# Patient Record
Sex: MALE | Race: WHITE | URBAN - METROPOLITAN AREA
[De-identification: names, ages, dates, MRNs, and addresses within clinical notes are randomized per-mention and may not be internally consistent; named-entity substitution may affect disease eponyms.]

---

## 2017-01-02 DIAGNOSIS — M25.552 LEFT HIP PAIN: Primary | ICD-10-CM

## 2017-01-02 RX ORDER — TRAMADOL HYDROCHLORIDE 50 MG/1
50 TABLET ORAL EVERY 6 HOURS PRN
Qty: 60 TABLET | Refills: 2 | Status: SHIPPED | OUTPATIENT
Start: 2017-01-02

## 2017-01-02 NOTE — TELEPHONE ENCOUNTER
traMADol (ULTRAM) 50 MG tablet      Last Written Prescription Date:  10-27-14  Last Fill Quantity: 60,   # refills: 3  [facility] provider: 10-6-16  Future Office visit:    Next 5 appointments (look out 90 days)     Jan 18, 2017  9:15 AM   Nurse [facility] with [facility]LL[facility]   [facility]    [address] [place] [id number] [phone]                   Routing refill request to provider for review/approval because:  Drug not on the [facility], [facility] or [facility] refill protocol or controlled substance

## 2017-01-03 ENCOUNTER — OFFICE VISIT (OUTPATIENT)
Dept: FAMILY MEDICINE | Facility: CLINIC | Age: 82
End: 2017-01-03
Payer: COMMERCIAL

## 2017-01-03 VITALS
WEIGHT: 158 LBS | TEMPERATURE: 97 F | BODY MASS INDEX: 25.39 KG/M2 | DIASTOLIC BLOOD PRESSURE: 71 MMHG | SYSTOLIC BLOOD PRESSURE: 142 MMHG | HEART RATE: 77 BPM | HEIGHT: 66 IN | OXYGEN SATURATION: 100 %

## 2017-01-03 DIAGNOSIS — S50.312A ABRASION OF LEFT ELBOW, INITIAL ENCOUNTER: Primary | ICD-10-CM

## 2017-01-03 PROCEDURE — 99213 OFFICE O/P EST LOW 20 MIN: CPT | Performed by: FAMILY MEDICINE

## 2017-01-03 NOTE — PROGRESS NOTES
SUBJECTIVE:                                                    Paul Nava is a 83 year old male who presents to clinic today for the following health issues:      Concern - Cut on left arm     Onset: Fell yesterday in the kitchen and has a cut on his left arm     Description:   Scraped the skin on his arm    Intensity: moderate    Progression of Symptoms:  same    Accompanying Signs & Symptoms:  Fell while sitting down on a chair and hit his arm on the refrigerator       Previous history of similar problem:       Precipitating factors:   Worsened by:     Alleviating factors:  Improved by:        Therapies Tried and outcome: None      No loss of consciousness. No other apparent injuries. He mainly just has the abrasion over the left elbow that he is concerned about. The rest of the left arm doesn't hurt.    Problem list and histories reviewed & adjusted, as indicated.  Additional history: as documented    Patient Active Problem List   Diagnosis     ED (erectile dysfunction)     Headaches, tension     PAD (peripheral artery disease) (H)     Microalbuminuria     Hyperlipidemia LDL goal <100     Parkinson disease (H)     Type 2 diabetes, HbA1C goal < 8% (H)     CKD (chronic kidney disease) stage 3, GFR 30-59 ml/min     Advance Care Planning     Health Care Home     Subclinical hypothyroidism     Anemia in chronic renal disease     Type 2 diabetes mellitus with diabetic retinopathy, macular edema presence unspecified, with unspecified retinopathy severity     Essential hypertension with goal blood pressure less than 140/90     Type 2 diabetes mellitus with diabetic nephropathy, without long-term current use of insulin (H)     Past Surgical History   Procedure Laterality Date     Hc knee scope,single menisectomy  1968     right     Hernia repair, inguinal rt/lt  1993     bilateral     Appendectomy  5/98     C nonspecific procedure  8/98     bx of left anxillary lymph nodes     Cataract iol, rt/lt  10/11     bilat  "cataracts removed in Oct 2011       Social History   Substance Use Topics     Smoking status: Never Smoker      Smokeless tobacco: Never Used     Alcohol Use: No     Family History   Problem Relation Age of Onset     Hypertension Brother      DIABETES Brother      Hypertension Sister      HEART DISEASE Brother      CEREBROVASCULAR DISEASE Father      HEART DISEASE Father      DIABETES Mother          Current Outpatient Prescriptions   Medication Sig Dispense Refill     traMADol (ULTRAM) 50 MG tablet Take 1 tablet (50 mg) by mouth every 6 hours as needed for pain 60 tablet 2     pravastatin (PRAVACHOL) 40 MG tablet Take 1 tablet (40 mg) by mouth daily 90 tablet 3     blood glucose (DAVEY BREEZE 2) test strip Test blood sugar 1-2 times a day. 100 each 4     lisinopril (PRINIVIL,ZESTRIL) 2.5 MG tablet Take 1 tablet (2.5 mg) by mouth daily 90 tablet 3     hydrochlorothiazide (HYDRODIURIL) 25 MG tablet Take 1 tablet (25 mg) by mouth every morning 90 tablet 3     blood glucose monitoring (DAVEY BREEZE 2 SYSTEM) meter device kit Use once or twice daily as directed. Dispense brand per insurance preference. 1 kit 0     blood glucose monitoring (DAVEY MICROLET) lancets Test blood sugar once daily 1 Box 10     Multiple Vitamins-Minerals (VISION FORMULA PO) Take  by mouth.       SINEMET  MG OR TABS 1 1/2 twice daily       GLIPIZIDE 10 MG OR TABS 1 tabley two times daily       AMITRIPTYLINE HCL 25 MG OR TABS 1 TABLET AT BEDTIME       PROPRANOLOL HCL 80 MG OR TABS 1 TABLET TWICE DAILY       aspirin 162 MG EC tablet Take 162 mg by mouth daily.       GEMFIBROZIL 600 MG OR TABS 1 TABLET TWICE DAILY BEFORE MEALS       LAMISIL EX 1 daily       METFORMIN  MG OR TABS 1 TABLET TWICE DAILY WITH FOOD       No Known Allergies    ROS:  Noncontributory except as above    OBJECTIVE:                                                    /71 mmHg  Pulse 77  Temp(Src) 97  F (36.1  C) (Oral)  Ht 5' 5.75\" (1.67 m)  Wt 158 lb " (71.668 kg)  BMI 25.70 kg/m2  SpO2 100%  Body mass index is 25.7 kg/(m^2).  GENERAL: alert, no distress and elderly  SKIN: he has a superficial skin tear/abrasion over the extensor side of his left elbow. It measures a couple of centimeters in diameter. The wound is clean. It is not actively bleeding. It is not deep.    Diagnostic Test Results:  none      ASSESSMENT/PLAN:                                                          ICD-10-CM    1. Abrasion of left elbow, initial encounter S50.312A      There is nothing here that require sutures  We applied bacitracin ointment to the wound and then covered it with a Telfa nonstick dressing and Coban  He was advised on routine wound care  He was also informed that his tramadol was refilled for him yesterday    If new, worsening or persistent symptoms, the patient is to call or return for a recheck      Juan Weiss MD  [facility]

## 2017-01-03 NOTE — NURSING NOTE
"Chief Complaint   Patient presents with     Musculoskeletal Problem     Left arm pain, Fell yesterday in the kitchen and has a cut on his left arm     Refill Request     Tramadol       Initial /70 mmHg  Pulse 77  Temp(Src) 97  F (36.1  C) (Oral)  Ht 5' 5.75\" (1.67 m)  Wt 158 lb (71.668 kg)  BMI 25.70 kg/m2  SpO2 100% Estimated body mass index is 25.7 kg/(m^2) as calculated from the following:    Height as of this encounter: 5' 5.75\" (1.67 m).    Weight as of this encounter: 158 lb (71.668 kg).  BP completed using cuff size: katiana Stahl CMA      "

## 2017-01-03 NOTE — MR AVS SNAPSHOT
After Visit Summary   1/3/2017    Paul Nava    MRN: [id number]           Patient Information     Date Of Birth          12/1/1933        Visit Information        Provider Department      1/3/2017 10:20 AM Juan Weiss MD [facility]        Today's Diagnoses     Abrasion of left elbow, initial encounter    -  1        Follow-ups after your visit        Follow-up notes from your care team     Return if symptoms worsen or fail to improve.      Your next 10 appointments already scheduled     Jan 18, 2017  9:15 AM   Nurse Only with CP ANCILLARY   [facility]    [address] [place] [id number]   [phone]            Apr 03, 2017  8:30 AM   LAB with CP LAB   [facility]    [address] [place] [id number]   [phone]           Patient must bring picture ID.  Patient should be prepared to give a urine specimen  Please do not eat 10-12 hours before your appointment if you are coming in fasting for labs on lipids, cholesterol, or glucose (sugar).  Pregnant women should follow their Care Team instructions. Water with medications is okay. Do not drink coffee or other fluids.   If you have concerns about taking  your medications, please ask at office or if scheduling via [employer], send a message by clicking on Secure Messaging, Message Your Care Team.            Apr 05, 2017  9:40 AM   SHORT with Juan Weiss MD   [facility]    [address] [place] [id number]   [phone]            Apr 10, 2017  8:20 AM   SHORT with Juan Weiss MD   [facility]    [address] [place] [phone]   [phone]              Who to contact   "   If you have questions or need follow up information about today's clinic visit or your schedule please contact [facility] directly at [phone].  Normal or non-critical lab and imaging results will be communicated to you by MyChart, letter or phone within 4 business days after the clinic has received the results. If you do not hear from us within 7 days, please contact the clinic through [employer] or phone. If you have a critical or abnormal lab result, we will notify you by phone as soon as possible.  Submit refill requests through [employer] or call your pharmacy and they will forward the refill request to us. Please allow 3 business days for your refill to be completed.          Additional Information About Your Visit        [id number] Information     [employer] lets you send messages to your doctor, view your test results, renew your prescriptions, schedule appointments and more. To sign up, go to [email or web address] . Click on \"Log in\" on the left side of the screen, which will take you to the Welcome page. Then click on \"Sign up Now\" on the right side of the page.     You will be asked to enter the access code listed below, as well as some personal information. Please follow the directions to create your username and password.     Your access code is: [email or web address]  Expires: 2017 10:14 AM     Your access code will  in 90 days. If you need help or a new code, please call your [place] clinic or [phone].        Care EveryWhere ID     This is your Care EveryWhere ID. This could be used by other organizations to access your [place] medical records  [id number]        Your Vitals Were     Pulse Temperature Height BMI (Body Mass Index) Pulse Oximetry       77 97  F (36.1  C) (Oral) 5' 5.75\" (1.67 m) 25.70 kg/m2 100%        Blood Pressure from Last 3 Encounters:   17 148/70   16 141/58   16 118/54    Weight from Last 3 Encounters:   17 158 lb (71.668 kg) "   10/06/16 158 lb (71.668 kg)   04/06/16 161 lb (73.029 kg)              Today, you had the following     No orders found for display       Primary Care Provider Office Phone # Fax #    Juan Weiss [phone]       [facility] [address]  [place]. \Hospital [facility] Outlook.\"" [id number]        Thank you!     Thank you for choosing [facility]  for your care. Our goal is always to provide you with excellent care. Hearing back from our patients is one way we can continue to improve our services. Please take a few minutes to complete the written survey that you may receive in the mail after your visit with us. Thank you!             Your Updated Medication List - Protect others around you: Learn how to safely use, store and throw away your medicines at www.disposemymeds.org.          This list is accurate as of: 1/3/17 10:56 AM.  Always use your most recent med list.                   Brand Name Dispense Instructions for use    amitriptyline 25 MG tablet    ELAVIL     1 TABLET AT BEDTIME       aspirin 162 MG EC tablet      Take 162 mg by mouth daily.       blood glucose monitoring lancets     1 Box    Test blood sugar once daily       blood glucose monitoring meter device kit     1 kit    Use once or twice daily as directed. Dispense brand per insurance preference.       blood glucose test strip    DAVEY BREEZE 2    100 each    Test blood sugar 1-2 times a day.       gemfibrozil 600 MG tablet    LOPID     1 TABLET TWICE DAILY BEFORE MEALS       glipiZIDE 10 MG tablet    GLUCOTROL     1 tabley two times daily       hydrochlorothiazide 25 MG tablet    HYDRODIURIL    90 tablet    Take 1 tablet (25 mg) by mouth every morning       LAMISIL EX      1 daily       lisinopril 2.5 MG tablet    PRINIVIL/Zestril    90 tablet    Take 1 tablet (2.5 mg) by mouth daily       metFORMIN 500 MG tablet    GLUCOPHAGE     1 TABLET TWICE DAILY WITH FOOD       pravastatin 40 MG tablet    PRAVACHOL     90 tablet    Take 1 tablet (40 mg) by mouth daily       propranolol 80 MG tablet    INDERAL     1 TABLET TWICE DAILY       SINEMET  MG per tablet   Generic drug:  carbidopa-levodopa      1 1/2 twice daily       traMADol 50 MG tablet    ULTRAM    60 tablet    Take 1 tablet (50 mg) by mouth every 6 hours as needed for pain       VISION FORMULA PO      Take  by mouth.

## 2017-01-10 ENCOUNTER — TRANSFERRED RECORDS (OUTPATIENT)
Dept: HEALTH INFORMATION MANAGEMENT | Facility: CLINIC | Age: 82
End: 2017-01-10

## 2017-01-18 ENCOUNTER — ALLIED HEALTH/NURSE VISIT (OUTPATIENT)
Dept: NURSING | Facility: CLINIC | Age: 82
End: 2017-01-18
Payer: COMMERCIAL

## 2017-01-18 ENCOUNTER — TELEPHONE (OUTPATIENT)
Dept: FAMILY MEDICINE | Facility: CLINIC | Age: 82
End: 2017-01-18

## 2017-01-18 VITALS — SYSTOLIC BLOOD PRESSURE: 130 MMHG | DIASTOLIC BLOOD PRESSURE: 58 MMHG

## 2017-01-18 DIAGNOSIS — I10 ESSENTIAL HYPERTENSION WITH GOAL BLOOD PRESSURE LESS THAN 140/90: Primary | ICD-10-CM

## 2017-01-18 PROCEDURE — 99207 ZZC NO CHARGE NURSE ONLY: CPT

## 2017-01-18 NOTE — TELEPHONE ENCOUNTER
See CC'ed chart note from provider:    Progress Notes      Juan Weiss MD at 1/18/2017  8:59 AM      Status: Signed         Expand All Collapse All    Patient expressed a concern today to MA about bruising on his hands. This generally is related to aspirin use along with the aging process where the skin is thinner and the blood vessels are more fragile. It isn't anything serious and he should just continue to stay on his aspirin.               Leida Ramires RN  [facility]

## 2017-01-18 NOTE — Clinical Note
Patient came in today with wife and stated that he has had large hematomas  on hands and various other places on his body (not painful) for no reason off and on.  Patient wonders if this is because of his asprin use, please advise patient thank you.  Yumiko Landaverde Certified Medical Assistant / [facility] Ancillary Advance Care Facilitator

## 2017-01-18 NOTE — NURSING NOTE
"Chief Complaint   Patient presents with     Patient Request     BP check        Initial /58 mmHg Estimated body mass index is 25.70 kg/(m^2) as calculated from the following:    Height as of 1/3/17: 5' 5.75\" (1.67 m).    Weight as of 1/3/17: 158 lb (71.668 kg).  BP completed using cuff size: regular  Yumiko Landaverde Certified Medical Assistant / [facility]  Clinic Ancillary  Advance Care Facilitator          "

## 2017-01-18 NOTE — PROGRESS NOTES
Patient expressed a concern today to MA about bruising on his hands. This generally is related to aspirin use along with the aging process where the skin is thinner and the blood vessels are more fragile. It isn't anything serious and he should just continue to stay on his aspirin.

## 2017-01-18 NOTE — TELEPHONE ENCOUNTER
I called and spoke to patient and advised him of provider's advice below.  He is satisfied with this explanation and has no further concerns at this time.  Leida Ramires RN  [facility]

## 2017-02-15 ENCOUNTER — ALLIED HEALTH/NURSE VISIT (OUTPATIENT)
Dept: NURSING | Facility: CLINIC | Age: 82
End: 2017-02-15
Payer: COMMERCIAL

## 2017-02-15 VITALS — DIASTOLIC BLOOD PRESSURE: 62 MMHG | SYSTOLIC BLOOD PRESSURE: 120 MMHG

## 2017-02-15 DIAGNOSIS — I10 ESSENTIAL HYPERTENSION WITH GOAL BLOOD PRESSURE LESS THAN 140/90: Primary | ICD-10-CM

## 2017-02-15 PROCEDURE — 99207 ZZC NO CHARGE NURSE ONLY: CPT

## 2017-02-15 NOTE — MR AVS SNAPSHOT
After Visit Summary   2/15/2017    Paul Nava    MRN: [id number]           Patient Information     Date Of Birth          12/1/1933        Visit Information        Provider Department      2/15/2017 9:15 AM CP ANCILLARY [facility]        Today's Diagnoses     Essential hypertension with goal blood pressure less than 140/90    -  1       Follow-ups after your visit        Your next 10 appointments already scheduled     Mar 22, 2017  9:15 AM CDT   Nurse Only with CP ANCILLARY   [facility]    [address] [place] [phone]   [phone]            Apr 03, 2017  8:30 AM CDT   LAB with CP LAB   [facility]    [address] [place] [address]   [phone]           Patient must bring picture ID.  Patient should be prepared to give a urine specimen  Please do not eat 10-12 hours before your appointment if you are coming in fasting for labs on lipids, cholesterol, or glucose (sugar).  Pregnant women should follow their Care Team instructions. Water with medications is okay. Do not drink coffee or other fluids.   If you have concerns about taking  your medications, please ask at office or if scheduling via [employer], send a message by clicking on Secure Messaging, Message Your Care Team.            Apr 05, 2017  9:40 AM CDT   SHORT with Juan Weiss MD   [facility]    [address] [place] [phone]   [phone]            Apr 10, 2017  8:20 AM CDT   SHORT with Juan Weiss MD   [facility]    [address] [place] [address]   [phone]              Who to contact     If you have questions or need follow up information  "about today's clinic visit or your schedule please contact [facility] directly at [phone].  Normal or non-critical lab and imaging results will be communicated to you by MyChart, letter or phone within 4 business days after the clinic has received the results. If you do not hear from us within 7 days, please contact the clinic through MyChart or phone. If you have a critical or abnormal lab result, we will notify you by phone as soon as possible.  Submit refill requests through [employer] or call your pharmacy and they will forward the refill request to us. Please allow 3 business days for your refill to be completed.          Additional Information About Your Visit        [employer] Information     [employer] lets you send messages to your doctor, view your test results, renew your prescriptions, schedule appointments and more. To sign up, go to [email or web address] . Click on \"Log in\" on the left side of the screen, which will take you to the Welcome page. Then click on \"Sign up Now\" on the right side of the page.     You will be asked to enter the access code listed below, as well as some personal information. Please follow the directions to create your username and password.     Your access code is: [email or web address]  Expires: 2017  9:25 AM     Your access code will  in 90 days. If you need help or a new code, please call your [place] clinic or [phone].        Care EveryWhere ID     This is your Care EveryWhere ID. This could be used by other organizations to access your [place] medical records  [id number]         Blood Pressure from Last 3 Encounters:   02/15/17 120/62   17 130/58   17 142/71    Weight from Last 3 Encounters:   17 158 lb (71.7 kg)   10/06/16 158 lb (71.7 kg)   16 161 lb (73 kg)              Today, you had the following     No orders found for display       Primary Care Provider Office Phone # Fax #    Juan Weiss [phone] " [phone]       [facility] [address]  [facility] [address]        Thank you!     Thank you for choosing [facility]  for your care. Our goal is always to provide you with excellent care. Hearing back from our patients is one way we can continue to improve our services. Please take a few minutes to complete the written survey that you may receive in the mail after your visit with us. Thank you!             Your Updated Medication List - Protect others around you: Learn how to safely use, store and throw away your medicines at www.disposemymeds.org.          This list is accurate as of: 2/15/17  9:25 AM.  Always use your most recent med list.                   Brand Name Dispense Instructions for use    amitriptyline 25 MG tablet    ELAVIL     1 TABLET AT BEDTIME       aspirin 162 MG EC tablet      Take 162 mg by mouth daily.       blood glucose monitoring lancets     1 Box    Test blood sugar once daily       blood glucose monitoring meter device kit     1 kit    Use once or twice daily as directed. Dispense brand per insurance preference.       blood glucose test strip    DAVEY BREEZE 2    100 each    Test blood sugar 1-2 times a day.       gemfibrozil 600 MG tablet    LOPID     1 TABLET TWICE DAILY BEFORE MEALS       glipiZIDE 10 MG tablet    GLUCOTROL     1 tabley two times daily       hydrochlorothiazide 25 MG tablet    HYDRODIURIL    90 tablet    Take 1 tablet (25 mg) by mouth every morning       LAMISIL EX      1 daily       lisinopril 2.5 MG tablet    PRINIVIL/Zestril    90 tablet    Take 1 tablet (2.5 mg) by mouth daily       metFORMIN 500 MG tablet    GLUCOPHAGE     1 TABLET TWICE DAILY WITH FOOD       pravastatin 40 MG tablet    PRAVACHOL    90 tablet    Take 1 tablet (40 mg) by mouth daily       propranolol 80 MG tablet    INDERAL     1 TABLET TWICE DAILY       SINEMET  MG per tablet   Generic drug:  carbidopa-levodopa      1 1/2 twice daily        traMADol 50 MG tablet    ULTRAM    60 tablet    Take 1 tablet (50 mg) by mouth every 6 hours as needed for pain       VISION FORMULA PO      Take  by mouth.

## 2017-02-15 NOTE — NURSING NOTE
"Chief Complaint   Patient presents with     Patient Request     BP check      /62 Estimated body mass index is 25.7 kg/(m^2) as calculated from the following:    Height as of 1/3/17: 5' 5.75\" (1.67 m).    Weight as of 1/3/17: 158 lb (71.7 kg).  bp completed using cuff size: ro Landaverde Certified Medical Assistant / [facility]  Clinic Ancillary  Advance Care Facilitator        "

## 2017-03-06 DIAGNOSIS — E11.9 TYPE 2 DIABETES, HBA1C GOAL < 8% (H): ICD-10-CM

## 2017-03-06 NOTE — TELEPHONE ENCOUNTER
blood glucose monitoring (DAVEY MICROLET) lancets         Last Written Prescription Date: 9/30/15  Last Fill Quantity: 1, # refills: 10  Last Office Visit with [facility], [facility] or [facility] prescribing provider:  1/3/17   Next 5 appointments (look out 90 days)     Mar 22, 2017  9:15 AM CDT   Nurse Only with CP ANCILLARY   [facility]    [address] [place] [phone]   [phone]            Apr 05, 2017  9:40 AM CDT   SHORT with Juan Weiss MD   [facility]    [address] [place] [address] [place] [address]   [phone]            Apr 10, 2017  8:20 AM CDT   SHORT with Juan Weiss MD   [facility]    [address] [place] [address] [place] [address]   [phone]                   BP Readings from Last 3 Encounters:   02/15/17 120/62   01/18/17 130/58   01/03/17 142/71     Lab Results   Component Value Date    MICROL 52 09/29/2016     No results found for: MICROALBUMIN  Creatinine   Date Value Ref Range Status   09/29/2016 1.31 (H) 0.66 - 1.25 mg/dL Final   ]  GFR Estimate   Date Value Ref Range Status   09/29/2016 52 (L) >60 mL/min/1.7m2 Final     Comment:     Non  GFR Calc   03/28/2016 54 (L) >60 mL/min/1.7m2 Final     Comment:     Non  GFR Calc   09/23/2015 50 (L) >60 mL/min/1.7m2 Final     Comment:     Non  GFR Calc     GFR Estimate If Black   Date Value Ref Range Status   09/29/2016 63 >60 mL/min/1.7m2 Final     Comment:      GFR Calc   03/28/2016 65 >60 mL/min/1.7m2 Final     Comment:      GFR Calc   09/23/2015 60 (L) >60 mL/min/1.7m2 Final     Comment:      GFR Calc     Lab Results   Component Value Date    CHOL 134 09/29/2016     Lab Results   Component Value Date    HDL 24  09/29/2016     Lab Results   Component Value Date    LDL 39 09/29/2016     Lab Results   Component Value Date    TRIG 354 09/29/2016     Lab Results   Component Value Date    CHOLHDLRATIO 5.0 09/23/2015     Lab Results   Component Value Date    AST 35 01/19/2012     Lab Results   Component Value Date    ALT 13 09/16/2014     Lab Results   Component Value Date    A1C 6.9 09/29/2016    A1C 6.9 03/28/2016    A1C 6.9 09/23/2015    A1C 7.2 03/23/2015    A1C 7.0 09/16/2014     Potassium   Date Value Ref Range Status   09/29/2016 4.2 3.4 - 5.3 mmol/L Final

## 2017-03-08 RX ORDER — LANCETS
EACH MISCELLANEOUS
Qty: 100 EACH | Refills: 0 | Status: SHIPPED | OUTPATIENT
Start: 2017-03-08 | End: 2017-07-07

## 2017-03-08 NOTE — TELEPHONE ENCOUNTER
Prescription approved per [facility] Refill Protocol.  Vijaya Mejia, RN [facility] Triage.

## 2017-03-14 ENCOUNTER — TRANSFERRED RECORDS (OUTPATIENT)
Dept: HEALTH INFORMATION MANAGEMENT | Facility: CLINIC | Age: 82
End: 2017-03-14

## 2017-04-05 ENCOUNTER — OFFICE VISIT (OUTPATIENT)
Dept: FAMILY MEDICINE | Facility: CLINIC | Age: 82
End: 2017-04-05
Payer: COMMERCIAL

## 2017-04-05 VITALS
TEMPERATURE: 96.8 F | SYSTOLIC BLOOD PRESSURE: 136 MMHG | HEART RATE: 68 BPM | BODY MASS INDEX: 25.53 KG/M2 | DIASTOLIC BLOOD PRESSURE: 70 MMHG | WEIGHT: 157 LBS | OXYGEN SATURATION: 98 %

## 2017-04-05 DIAGNOSIS — N18.9 ANEMIA IN CHRONIC RENAL DISEASE: ICD-10-CM

## 2017-04-05 DIAGNOSIS — N18.30 CKD (CHRONIC KIDNEY DISEASE) STAGE 3, GFR 30-59 ML/MIN (H): ICD-10-CM

## 2017-04-05 DIAGNOSIS — G20.A1 PARKINSON'S DISEASE (H): ICD-10-CM

## 2017-04-05 DIAGNOSIS — E03.8 SUBCLINICAL HYPOTHYROIDISM: ICD-10-CM

## 2017-04-05 DIAGNOSIS — D63.1 ANEMIA IN CHRONIC RENAL DISEASE: ICD-10-CM

## 2017-04-05 DIAGNOSIS — I10 ESSENTIAL HYPERTENSION WITH GOAL BLOOD PRESSURE LESS THAN 140/90: ICD-10-CM

## 2017-04-05 DIAGNOSIS — E78.5 HYPERLIPIDEMIA LDL GOAL <100: ICD-10-CM

## 2017-04-05 DIAGNOSIS — E11.21 TYPE 2 DIABETES MELLITUS WITH DIABETIC NEPHROPATHY, WITHOUT LONG-TERM CURRENT USE OF INSULIN (H): Primary | ICD-10-CM

## 2017-04-05 LAB
ANION GAP SERPL CALCULATED.3IONS-SCNC: 8 MMOL/L (ref 3–14)
BUN SERPL-MCNC: 37 MG/DL (ref 7–30)
CALCIUM SERPL-MCNC: 9.5 MG/DL (ref 8.5–10.1)
CHLORIDE SERPL-SCNC: 103 MMOL/L (ref 94–109)
CO2 SERPL-SCNC: 27 MMOL/L (ref 20–32)
CREAT SERPL-MCNC: 1.27 MG/DL (ref 0.66–1.25)
GFR SERPL CREATININE-BSD FRML MDRD: 54 ML/MIN/1.7M2
GLUCOSE SERPL-MCNC: 146 MG/DL (ref 70–99)
HBA1C MFR BLD: 6.6 % (ref 4.3–6)
POTASSIUM SERPL-SCNC: 4.6 MMOL/L (ref 3.4–5.3)
SODIUM SERPL-SCNC: 138 MMOL/L (ref 133–144)

## 2017-04-05 PROCEDURE — 99214 OFFICE O/P EST MOD 30 MIN: CPT | Performed by: FAMILY MEDICINE

## 2017-04-05 PROCEDURE — 83036 HEMOGLOBIN GLYCOSYLATED A1C: CPT | Performed by: FAMILY MEDICINE

## 2017-04-05 PROCEDURE — 80048 BASIC METABOLIC PNL TOTAL CA: CPT | Performed by: FAMILY MEDICINE

## 2017-04-05 PROCEDURE — 36415 COLL VENOUS BLD VENIPUNCTURE: CPT | Performed by: FAMILY MEDICINE

## 2017-04-05 NOTE — MR AVS SNAPSHOT
After Visit Summary   4/5/2017    Paul Nava    MRN: [id number]           Patient Information     Date Of Birth          12/1/1933        Visit Information        Provider Department      4/5/2017 9:40 AM Juan Weiss MD [facility]        Today's Diagnoses     Type 2 diabetes mellitus with diabetic nephropathy, without long-term current use of insulin (H)    -  1    CKD (chronic kidney disease) stage 3, GFR 30-59 ml/min        Anemia in chronic renal disease        Parkinson's disease (H)        Essential hypertension with goal blood pressure less than 140/90           Follow-ups after your visit        Follow-up notes from your care team     Return in about 6 months (around 10/5/2017) for Lab Work, Physical Exam.      Your next 10 appointments already scheduled     Apr 19, 2017  9:15 AM CDT   Nurse Only with CP ANCILLARY   [facility]    [address] [place] [id number]   [phone]            Sep 25, 2017  8:15 AM CDT   LAB with CP LAB   [facility] ([facility]    [address] [place] [id number]   [phone]           Patient must bring picture ID.  Patient should be prepared to give a urine specimen  Please do not eat 10-12 hours before your appointment if you are coming in fasting for labs on lipids, cholesterol, or glucose (sugar).  Pregnant women should follow their Care Team instructions. Water with medications is okay. Do not drink coffee or other fluids.   If you have concerns about taking  your medications, please ask at office or if scheduling via [employer], send a message by clicking on Secure Messaging, Message Your Care Team.            Oct 09, 2017  9:20 AM CDT   PHYSICAL with Juan Weiss MD   [facility]    [address] [place] [address]  "[facility] [id number] [phone]              Who to contact     If you have questions or need follow up information about today's clinic visit or your schedule please contact [facility] directly at [phone].  Normal or non-critical lab and imaging results will be communicated to you by MyChart, letter or phone within 4 business days after the clinic has received the results. If you do not hear from us within 7 days, please contact the clinic through MyChart or phone. If you have a critical or abnormal lab result, we will notify you by phone as soon as possible.  Submit refill requests through [employer] or call your pharmacy and they will forward the refill request to us. Please allow 3 business days for your refill to be completed.          Additional Information About Your Visit        MyChart Information     [employer] lets you send messages to your doctor, view your test results, renew your prescriptions, schedule appointments and more. To sign up, go to [email or web address] . Click on \"Log in\" on the left side of the screen, which will take you to the Welcome page. Then click on \"Sign up Now\" on the right side of the page.     You will be asked to enter the access code listed below, as well as some personal information. Please follow the directions to create your username and password.     Your access code is: [email or web address]  Expires: 2017 10:25 AM     Your access code will  in 90 days. If you need help or a new code, please call your [facility] or [phone].        Care EveryWhere ID     This is your Care EveryWhere ID. This could be used by other organizations to access your [facility] medical records  [id number]        Your Vitals Were     Pulse Temperature Pulse Oximetry BMI (Body Mass Index)          68 96.8  F (36  C) (Oral) 98% 25.53 kg/m2         Blood Pressure from Last 3 Encounters:   17 136/70   02/15/17 120/62   17 130/58 "    Weight from Last 3 Encounters:   04/05/17 157 lb (71.2 kg)   01/03/17 158 lb (71.7 kg)   10/06/16 158 lb (71.7 kg)              We Performed the Following     Basic metabolic panel     Hemoglobin A1c        Primary Care Provider Office Phone # Fax #    Juan Weiss [phone]       [facility] [address]  [facility] [address]        Thank you!     Thank you for choosing [facility]  for your care. Our goal is always to provide you with excellent care. Hearing back from our patients is one way we can continue to improve our services. Please take a few minutes to complete the written survey that you may receive in the mail after your visit with us. Thank you!             Your Updated Medication List - Protect others around you: Learn how to safely use, store and throw away your medicines at www.disposemymeds.org.          This list is accurate as of: 4/5/17  9:56 AM.  Always use your most recent med list.                   Brand Name Dispense Instructions for use    amitriptyline 25 MG tablet    ELAVIL     1 TABLET AT BEDTIME       aspirin 162 MG EC tablet      Take 81 mg by mouth daily Patient taking only half a day       blood glucose monitoring meter device kit     1 kit    Use once or twice daily as directed. Dispense brand per insurance preference.       blood glucose test strip    DAVEY BREEZE 2    100 each    Test blood sugar 1-2 times a day.       gemfibrozil 600 MG tablet    LOPID     1 TABLET TWICE DAILY BEFORE MEALS       glipiZIDE 10 MG tablet    GLUCOTROL     1 tabley two times daily       hydrochlorothiazide 25 MG tablet    HYDRODIURIL    90 tablet    Take 1 tablet (25 mg) by mouth every morning       LAMISIL EX      1 daily       lisinopril 2.5 MG tablet    PRINIVIL/Zestril    90 tablet    Take 1 tablet (2.5 mg) by mouth daily       metFORMIN 500 MG tablet    GLUCOPHAGE     1 TABLET TWICE DAILY WITH FOOD       MICROLET LANCETS  Misc     100 each    TEST ONCE DAILY       pravastatin 40 MG tablet    PRAVACHOL    90 tablet    Take 1 tablet (40 mg) by mouth daily       propranolol 80 MG tablet    INDERAL     1 TABLET TWICE DAILY       SINEMET  MG per tablet   Generic drug:  carbidopa-levodopa      1 1/2 twice daily       traMADol 50 MG tablet    ULTRAM    60 tablet    Take 1 tablet (50 mg) by mouth every 6 hours as needed for pain       VISION FORMULA PO      Take  by mouth.

## 2017-04-05 NOTE — PROGRESS NOTES
SUBJECTIVE:                                                    Paul Nava is a 83 year old male who presents to clinic today for the following health issues:        Diabetes Follow-up      Patient is checking blood sugars: 10 times a week --  Usually running around 130    Diabetic concerns: None     Symptoms of hypoglycemia (low blood sugar): none     Paresthesias (numbness or burning in feet) or sores: Yes, burning in feet once in a while     Date of last diabetic eye exam: a week ago     Hyperlipidemia Follow-Up      Rate your low fat/cholesterol diet?: good    Taking statin?  Yes, no muscle aches from statin    Other lipid medications/supplements?:  None     Hypertension Follow-up      Outpatient blood pressures are being checked at home.  Results are around 130 for systolic.    Low Salt Diet: not monitoring salt         Amount of exercise or physical activity: 2-3 days/week for an average of 15-30 minutes    Problems taking medications regularly: No    Medication side effects: none    Diet: regular (no restrictions)      He remains on glipizide and metformin for diabetes. Blood sugars are doing well.  He has generally been feeling well.  His left elbow wound healed up fine from January.  No new complaints.      Problem list and histories reviewed & adjusted, as indicated.  Additional history: as documented    Patient Active Problem List   Diagnosis     ED (erectile dysfunction)     Headaches, tension     PAD (peripheral artery disease) (H)     Microalbuminuria     Hyperlipidemia LDL goal <100     Type 2 diabetes, HbA1C goal < 8% (H)     CKD (chronic kidney disease) stage 3, GFR 30-59 ml/min     Advance Care Planning     Health Care Home     Subclinical hypothyroidism     Anemia in chronic renal disease     Type 2 diabetes mellitus with diabetic retinopathy, macular edema presence unspecified, with unspecified retinopathy severity     Essential hypertension with goal blood pressure less than 140/90      Type 2 diabetes mellitus with diabetic nephropathy, without long-term current use of insulin (H)     Parkinson's disease (H)     Past Surgical History:   Procedure Laterality Date     APPENDECTOMY  5/98     C NONSPECIFIC PROCEDURE  8/98    bx of left anxillary lymph nodes     CATARACT IOL, RT/LT  10/11    bilat cataracts removed in Oct 2011     HC KNEE SCOPE,SINGLE MENISECTOMY  1968    right     HERNIA REPAIR, INGUINAL RT/LT  1993    bilateral       Social History   Substance Use Topics     Smoking status: Never Smoker     Smokeless tobacco: Never Used     Alcohol use No     Family History   Problem Relation Age of Onset     Hypertension Brother      DIABETES Brother      Hypertension Sister      HEART DISEASE Brother      CEREBROVASCULAR DISEASE Father      HEART DISEASE Father      DIABETES Mother          Current Outpatient Prescriptions   Medication Sig Dispense Refill     MICROLET LANCETS MISC TEST ONCE DAILY 100 each 0     traMADol (ULTRAM) 50 MG tablet Take 1 tablet (50 mg) by mouth every 6 hours as needed for pain 60 tablet 2     pravastatin (PRAVACHOL) 40 MG tablet Take 1 tablet (40 mg) by mouth daily 90 tablet 3     blood glucose (DAVEY BREEZE 2) test strip Test blood sugar 1-2 times a day. 100 each 4     lisinopril (PRINIVIL,ZESTRIL) 2.5 MG tablet Take 1 tablet (2.5 mg) by mouth daily 90 tablet 3     hydrochlorothiazide (HYDRODIURIL) 25 MG tablet Take 1 tablet (25 mg) by mouth every morning 90 tablet 3     blood glucose monitoring (DAVEY BREEZE 2 SYSTEM) meter device kit Use once or twice daily as directed. Dispense brand per insurance preference. 1 kit 0     Multiple Vitamins-Minerals (VISION FORMULA PO) Take  by mouth.       SINEMET  MG OR TABS 1 1/2 twice daily       GLIPIZIDE 10 MG OR TABS 1 tabley two times daily       AMITRIPTYLINE HCL 25 MG OR TABS 1 TABLET AT BEDTIME       PROPRANOLOL HCL 80 MG OR TABS 1 TABLET TWICE DAILY       aspirin 162 MG EC tablet Take 81 mg by mouth daily Patient  taking only half a day       GEMFIBROZIL 600 MG OR TABS 1 TABLET TWICE DAILY BEFORE MEALS       LAMISIL EX 1 daily       METFORMIN  MG OR TABS 1 TABLET TWICE DAILY WITH FOOD       No Known Allergies    Reviewed and updated as needed this visit by clinical staff  Tobacco  Allergies  Meds  Med Hx  Surg Hx  Fam Hx  Soc Hx      Reviewed and updated as needed this visit by Provider         ROS:  C: NEGATIVE for fever, chills, change in weight  E/M: NEGATIVE for ear, mouth and throat problems  R: NEGATIVE for significant cough or SOB  CV: NEGATIVE for chest pain, palpitations or peripheral edema    OBJECTIVE:                                                    /70 (BP Location: Right arm, Patient Position: Chair, Cuff Size: Adult Regular)  Pulse 68  Temp 96.8  F (36  C) (Oral)  Wt 157 lb (71.2 kg)  SpO2 98%  BMI 25.53 kg/m2  Body mass index is 25.53 kg/(m^2).  GENERAL: alert, no distress and elderly  RESP: lungs clear to auscultation - no rales, rhonchi or wheezes  CV: regular rate and rhythm, normal S1 S2, no S3 or S4, no murmur, click or rub, no peripheral edema   MS: no gross musculoskeletal defects noted, no edema  NEURO: Has a mild resting tremor and slow shuffling gait consistent with Parkinson's disease. He uses a cane to help with ambulation.    Diagnostic Test Results:  none      ASSESSMENT/PLAN:                                                        ICD-10-CM    1. Type 2 diabetes mellitus with diabetic nephropathy, without long-term current use of insulin (H) E11.21 Hemoglobin A1c     Basic metabolic panel     Basic metabolic panel     Albumin Random Urine Quantitative     Hemoglobin A1c   2. CKD (chronic kidney disease) stage 3, GFR 30-59 ml/min N18.3    3. Anemia in chronic renal disease N18.9 CBC with platelets    D63.1    4. Parkinson's disease (H) G20    5. Essential hypertension with goal blood pressure less than 140/90 I10    6. Hyperlipidemia LDL goal <100 E78.5 Lipid panel reflex  to direct LDL   7. Subclinical hypothyroidism E03.9 TSH with free T4 reflex     Blood pressure and other vital signs look good  We will check fasting labs today for diabetes and renal function  Continue same baseline meds  Plan a recheck in 6 months with repeat comprehensive fasting labs followed by annual physical      Juan Weiss MD  [facility]

## 2017-04-05 NOTE — NURSING NOTE
"Chief Complaint   Patient presents with     Diabetes       Initial /70 (BP Location: Right arm, Patient Position: Chair, Cuff Size: Adult Regular)  Pulse 68  Temp 96.8  F (36  C) (Oral)  Wt 157 lb (71.2 kg)  SpO2 98%  BMI 25.53 kg/m2 Estimated body mass index is 25.53 kg/(m^2) as calculated from the following:    Height as of 1/3/17: 5' 5.75\" (1.67 m).    Weight as of this encounter: 157 lb (71.2 kg).  Medication Reconciliation: complete   Zunilda See CORNELIUS Seth      "

## 2017-04-05 NOTE — LETTER
[address] [id number] [phone]        January 16, 2018    Paul Nava  [address] [facility] [address]              Dear Paul Nava    This is to remind you that your fasting lab work is due.    You may call our office at [phone] to schedule an appointment.    Please disregard this notice if you have already had your labs drawn or made an appointment.        Sincerely,        Juan Weiss MD

## 2017-04-05 NOTE — LETTER
[facility]  [address] [place]  [address]  [phone]        April 6, 2017    Paul Nava  [address] [facility] [address]        Dear Paul,    Your diabetes remains well controlled. Your other lab results look stable from the past. Please continue your same medications. I would advise another recheck in about 6 months with your next physical.    Results for orders placed or performed in visit on 04/05/17   Hemoglobin A1c   Result Value Ref Range    Hemoglobin A1C 6.6 (H) 4.3 - 6.0 %   Basic metabolic panel   Result Value Ref Range    Sodium 138 133 - 144 mmol/L    Potassium 4.6 3.4 - 5.3 mmol/L    Chloride 103 94 - 109 mmol/L    Carbon Dioxide 27 20 - 32 mmol/L    Anion Gap 8 3 - 14 mmol/L    Glucose 146 (H) 70 - 99 mg/dL    Urea Nitrogen 37 (H) 7 - 30 mg/dL    Creatinine 1.27 (H) 0.66 - 1.25 mg/dL    GFR Estimate 54 (L) >60 mL/min/1.7m2    GFR Estimate If Black 65 >60 mL/min/1.7m2    Calcium 9.5 8.5 - 10.1 mg/dL       If you have any questions please call the clinic at [phone].    Sincerely,    Juan QUIÑONES

## 2017-04-06 NOTE — PROGRESS NOTES
Don,  Your diabetes remains well controlled. Your other lab results look stable from the past. Please continue your same medications. I would advise another recheck in about 6 months with your next physical.    Juan Weiss MD

## 2017-05-09 ENCOUNTER — TRANSFERRED RECORDS (OUTPATIENT)
Dept: HEALTH INFORMATION MANAGEMENT | Facility: CLINIC | Age: 82
End: 2017-05-09

## 2017-05-26 ENCOUNTER — TRANSFERRED RECORDS (OUTPATIENT)
Dept: HEALTH INFORMATION MANAGEMENT | Facility: CLINIC | Age: 82
End: 2017-05-26

## 2017-06-06 ENCOUNTER — CARE COORDINATION (OUTPATIENT)
Dept: CASE MANAGEMENT | Facility: CLINIC | Age: 82
End: 2017-06-06

## 2017-06-28 NOTE — PROGRESS NOTES
This writer contacted [facility]- patient is still inpatient in their TCU    Will follow-up in about 3 weeks    Fanta Guy

## 2017-07-07 DIAGNOSIS — E11.9 TYPE 2 DIABETES, HBA1C GOAL < 8% (H): ICD-10-CM

## 2017-07-07 NOTE — TELEPHONE ENCOUNTER
MICROLET LANCETS MISC         Last Written Prescription Date: 3/8/17  Last Fill Quantity: 100, # refills: 0  Last Office Visit with [facility], [facility] or [facility] prescribing provider:  4/5/17        BP Readings from Last 3 Encounters:   04/05/17 136/70   02/15/17 120/62   01/18/17 130/58     Lab Results   Component Value Date    MICROL 52 09/29/2016     Lab Results   Component Value Date    UMALCR 29.26 09/29/2016     Creatinine   Date Value Ref Range Status   04/05/2017 1.27 (H) 0.66 - 1.25 mg/dL Final   ]  GFR Estimate   Date Value Ref Range Status   04/05/2017 54 (L) >60 mL/min/1.7m2 Final     Comment:     Non  GFR Calc   09/29/2016 52 (L) >60 mL/min/1.7m2 Final     Comment:     Non  GFR Calc   03/28/2016 54 (L) >60 mL/min/1.7m2 Final     Comment:     Non  GFR Calc     GFR Estimate If Black   Date Value Ref Range Status   04/05/2017 65 >60 mL/min/1.7m2 Final     Comment:      GFR Calc   09/29/2016 63 >60 mL/min/1.7m2 Final     Comment:      GFR Calc   03/28/2016 65 >60 mL/min/1.7m2 Final     Comment:      GFR Calc     Lab Results   Component Value Date    CHOL 134 09/29/2016     Lab Results   Component Value Date    HDL 24 09/29/2016     Lab Results   Component Value Date    LDL 39 09/29/2016     Lab Results   Component Value Date    TRIG 354 09/29/2016     Lab Results   Component Value Date    CHOLHDLRATIO 5.0 09/23/2015     Lab Results   Component Value Date    AST 35 01/19/2012     Lab Results   Component Value Date    ALT 13 09/16/2014     Lab Results   Component Value Date    A1C 6.6 04/05/2017    A1C 6.9 09/29/2016    A1C 6.9 03/28/2016    A1C 6.9 09/23/2015    A1C 7.2 03/23/2015     Potassium   Date Value Ref Range Status   04/05/2017 4.6 3.4 - 5.3 mmol/L Final

## 2017-07-07 NOTE — TELEPHONE ENCOUNTER
Prescription approved per [facility] Refill Protocol.  Vijaya Mejia, RN CPC Triage.

## 2017-07-12 ENCOUNTER — CARE COORDINATION (OUTPATIENT)
Dept: CARE COORDINATION | Facility: CLINIC | Age: 82
End: 2017-07-12

## 2017-07-13 NOTE — PROGRESS NOTES
Clinic Care Coordination Contact  [facility]/Voicemail    Referral Source: IP/TCU Report  Clinical Data: Care Coordinator Outreach  Outreach attempted. Phone number is no longer in service.  Spoke with [facility]  SW. Stated patient and spouse have moved out of state. Was given contact information for sister in law. RN CC did not see ANYA for sister in law.      Plan: Care Coordinator will do no further outreaches at this time.    Kelsey Elkins RN  Clinic Care Coordinator  [facility]  [phone]

## 2017-11-04 DIAGNOSIS — I10 ESSENTIAL HYPERTENSION WITH GOAL BLOOD PRESSURE LESS THAN 140/90: ICD-10-CM

## 2017-11-04 DIAGNOSIS — E78.5 HYPERLIPIDEMIA LDL GOAL <100: ICD-10-CM

## 2017-11-08 RX ORDER — LISINOPRIL 2.5 MG/1
2.5 TABLET ORAL DAILY
Qty: 90 TABLET | Refills: 0 | Status: SHIPPED | OUTPATIENT
Start: 2017-11-08

## 2017-11-08 RX ORDER — PRAVASTATIN SODIUM 40 MG
40 TABLET ORAL DAILY
Qty: 90 TABLET | Refills: 0 | Status: SHIPPED | OUTPATIENT
Start: 2017-11-08

## 2017-11-08 RX ORDER — HYDROCHLOROTHIAZIDE 25 MG/1
TABLET ORAL
Qty: 90 TABLET | Refills: 0 | Status: SHIPPED | OUTPATIENT
Start: 2017-11-08

## 2018-02-22 ENCOUNTER — DOCUMENTATION ONLY (OUTPATIENT)
Dept: LAB | Facility: CLINIC | Age: 83
End: 2018-02-22

## 2018-02-22 NOTE — TELEPHONE ENCOUNTER
TC attempted to reach patient. Phone number has been disconnected and not in service. Letter printed and mailed to patient.

## 2018-02-22 NOTE — LETTER
Dear Paul,          Your Care Team attempted to reach you to communicate a message from your provider. Your phone number that we have on file has been disconnected and no longer in service. You are overdue to be seen for your annual physical. You can either come fasting to this appointment or you can schedule a lab appointment prior to your physical exam. Please contact the clinic to schedule at [phone].      Sincerely,    Juan QUIÑONES

## 2018-02-22 NOTE — PROGRESS NOTES
Please call patient and wife and assist them in getting an annual physical set up with me including fasting labs.  They can either do the labs ahead of time or at their visit with me.

## 2018-05-07 ENCOUNTER — DOCUMENTATION ONLY (OUTPATIENT)
Dept: LAB | Facility: CLINIC | Age: 83
End: 2018-05-07

## 2018-05-07 NOTE — PROGRESS NOTES
This patient has overdue labs. A letter was sent on 1/16/2018 and there has been no lab appointment made. If you still want these labs done, please have your care team contact the patient to make a lab appointment. Otherwise, please have the labs discontinued and close the encounter.    Thank you,  [facility]

## 2018-05-07 NOTE — PROGRESS NOTES
TC attempted to reach patient's sister Sandra Motta who is listed as an emergency contact for patient. Her number has also been disconnected. PCP has been updated.

## 2018-05-07 NOTE — TELEPHONE ENCOUNTER
TC attempted to reach patient. Phone has been disconnected. Mail was returned on 02/26/2018 with a message that they are unable to forward. Routing to PCP as FYI.

## 2018-05-07 NOTE — PROGRESS NOTES
It has been over a year since the patient was last in to see me.  He is due for a physical and fasting lab work.  Please call and advise him of this and assist him in scheduling that.